# Patient Record
Sex: FEMALE | NOT HISPANIC OR LATINO
[De-identification: names, ages, dates, MRNs, and addresses within clinical notes are randomized per-mention and may not be internally consistent; named-entity substitution may affect disease eponyms.]

---

## 2021-04-16 ENCOUNTER — APPOINTMENT (OUTPATIENT)
Age: 21
End: 2021-04-16
Payer: COMMERCIAL

## 2021-04-16 PROCEDURE — 0001A: CPT

## 2021-05-07 ENCOUNTER — APPOINTMENT (OUTPATIENT)
Age: 21
End: 2021-05-07
Payer: COMMERCIAL

## 2021-05-07 PROCEDURE — 0002A: CPT

## 2022-09-02 PROBLEM — Z00.00 ENCOUNTER FOR PREVENTIVE HEALTH EXAMINATION: Status: ACTIVE | Noted: 2022-09-02

## 2023-09-19 ENCOUNTER — APPOINTMENT (OUTPATIENT)
Dept: HEMATOLOGY ONCOLOGY | Facility: CLINIC | Age: 23
End: 2023-09-19

## 2024-01-19 ENCOUNTER — OUTPATIENT (OUTPATIENT)
Dept: OUTPATIENT SERVICES | Facility: HOSPITAL | Age: 24
LOS: 1 days | Discharge: HOME | End: 2024-01-19

## 2024-01-19 DIAGNOSIS — Q25.1 COARCTATION OF AORTA: ICD-10-CM

## 2024-01-26 ENCOUNTER — RESULT REVIEW (OUTPATIENT)
Age: 24
End: 2024-01-26

## 2024-01-26 ENCOUNTER — APPOINTMENT (OUTPATIENT)
Dept: HEMATOLOGY ONCOLOGY | Facility: CLINIC | Age: 24
End: 2024-01-26
Payer: COMMERCIAL

## 2024-01-26 ENCOUNTER — OUTPATIENT (OUTPATIENT)
Dept: OUTPATIENT SERVICES | Facility: HOSPITAL | Age: 24
LOS: 1 days | End: 2024-01-26
Payer: COMMERCIAL

## 2024-01-26 ENCOUNTER — NON-APPOINTMENT (OUTPATIENT)
Age: 24
End: 2024-01-26

## 2024-01-26 VITALS
TEMPERATURE: 98.2 F | DIASTOLIC BLOOD PRESSURE: 86 MMHG | SYSTOLIC BLOOD PRESSURE: 125 MMHG | OXYGEN SATURATION: 99 % | HEART RATE: 93 BPM | RESPIRATION RATE: 16 BRPM | HEIGHT: 62.99 IN | BODY MASS INDEX: 26.95 KG/M2 | WEIGHT: 152.12 LBS

## 2024-01-26 DIAGNOSIS — Z78.9 OTHER SPECIFIED HEALTH STATUS: ICD-10-CM

## 2024-01-26 DIAGNOSIS — Z82.49 FAMILY HISTORY OF ISCHEMIC HEART DISEASE AND OTHER DISEASES OF THE CIRCULATORY SYSTEM: ICD-10-CM

## 2024-01-26 DIAGNOSIS — Q25.1 COARCTATION OF AORTA: ICD-10-CM

## 2024-01-26 DIAGNOSIS — Z80.3 FAMILY HISTORY OF MALIGNANT NEOPLASM OF BREAST: ICD-10-CM

## 2024-01-26 DIAGNOSIS — D72.818 OTHER DECREASED WHITE BLOOD CELL COUNT: ICD-10-CM

## 2024-01-26 LAB
BASOPHILS # BLD AUTO: 0.03 K/UL — SIGNIFICANT CHANGE UP (ref 0–0.2)
BASOPHILS NFR BLD AUTO: 0.8 % — SIGNIFICANT CHANGE UP (ref 0–2)
EOSINOPHIL # BLD AUTO: 0.06 K/UL — SIGNIFICANT CHANGE UP (ref 0–0.5)
EOSINOPHIL NFR BLD AUTO: 1.7 % — SIGNIFICANT CHANGE UP (ref 0–6)
HCT VFR BLD CALC: 40.1 % — SIGNIFICANT CHANGE UP (ref 34.5–45)
HGB BLD-MCNC: 14.5 G/DL — SIGNIFICANT CHANGE UP (ref 11.5–15.5)
IMM GRANULOCYTES NFR BLD AUTO: 0.3 % — SIGNIFICANT CHANGE UP (ref 0–0.9)
LYMPHOCYTES # BLD AUTO: 1.37 K/UL — SIGNIFICANT CHANGE UP (ref 1–3.3)
LYMPHOCYTES # BLD AUTO: 38.4 % — SIGNIFICANT CHANGE UP (ref 13–44)
MCHC RBC-ENTMCNC: 30.5 PG — SIGNIFICANT CHANGE UP (ref 27–34)
MCHC RBC-ENTMCNC: 36.2 G/DL — HIGH (ref 32–36)
MCV RBC AUTO: 84.4 FL — SIGNIFICANT CHANGE UP (ref 80–100)
MONOCYTES # BLD AUTO: 0.23 K/UL — SIGNIFICANT CHANGE UP (ref 0–0.9)
MONOCYTES NFR BLD AUTO: 6.4 % — SIGNIFICANT CHANGE UP (ref 2–14)
NEUTROPHILS # BLD AUTO: 1.87 K/UL — SIGNIFICANT CHANGE UP (ref 1.8–7.4)
NEUTROPHILS NFR BLD AUTO: 52.4 % — SIGNIFICANT CHANGE UP (ref 43–77)
NRBC # BLD: 0 /100 WBCS — SIGNIFICANT CHANGE UP (ref 0–0)
PLATELET # BLD AUTO: 201 K/UL — SIGNIFICANT CHANGE UP (ref 150–400)
RBC # BLD: 4.75 M/UL — SIGNIFICANT CHANGE UP (ref 3.8–5.2)
RBC # FLD: 12.6 % — SIGNIFICANT CHANGE UP (ref 10.3–14.5)
WBC # BLD: 3.57 K/UL — LOW (ref 3.8–10.5)
WBC # FLD AUTO: 3.57 K/UL — LOW (ref 3.8–10.5)

## 2024-01-26 PROCEDURE — 99214 OFFICE O/P EST MOD 30 MIN: CPT

## 2024-01-26 PROCEDURE — 86901 BLOOD TYPING SEROLOGIC RH(D): CPT

## 2024-01-26 PROCEDURE — 86900 BLOOD TYPING SEROLOGIC ABO: CPT

## 2024-01-26 PROCEDURE — 86905 BLOOD TYPING RBC ANTIGENS: CPT

## 2024-01-26 PROCEDURE — 86850 RBC ANTIBODY SCREEN: CPT

## 2024-01-26 RX ORDER — NORGESTIMATE AND ETHINYL ESTRADIOL 7DAYSX3 LO
0.18/0.215/0.25 KIT ORAL
Refills: 0 | Status: ACTIVE | COMMUNITY

## 2024-01-26 NOTE — REASON FOR VISIT
[Follow-Up Visit] : a follow-up visit for [Blood Count Assessment] : blood count assessment [FreeTextEntry2] : chronic leukopenia

## 2024-01-26 NOTE — ASSESSMENT
[FreeTextEntry1] : 22 yo woman with asymptomatic leukopenia; negative T-cell receptor gene rearrangement; negative rheumatoid factor  - will check ABO blood grouping to evaluate for Thomas antigen which can be abnormal in patients with benign ethinic neutropenia - will review peripheral blood smear - no acute hematology intervention indicated  HEALTH MAINTENANCE SCREENING RECOMMENDATIONS PMD at least annually with lipid checks/bloodwork,  GYN at least annually and PAP test screening per their recommendations,   - Patient had the opportunity to have all their questions answered to their satisfaction - f/u annually

## 2024-01-26 NOTE — HISTORY OF PRESENT ILLNESS
[de-identified] : 22 yo woman previously followed at [facility] for chronic leukopenia with no evidence of clinical findings such as recurrent infections. Evaluation at that time showed negative TCR gene rearrangement and negative rheumatoid factor. She has been feeling well without any new complaints.  Transferring care as of 1/26/2024 from [facility] to  [facility] [de-identified] : 1/26/24 All of the patient's prior records including radiology, pathology and prior notes reviewed; Past Medical History, Past Surgical History, Family History and Social history reviewed and updated in the patient's chart. [100: Normal, no complaints, no evidence of disease.] : 100: Normal, no complaints, no evidence of disease.

## 2024-01-29 ENCOUNTER — TRANSCRIPTION ENCOUNTER (OUTPATIENT)
Age: 24
End: 2024-01-29

## 2024-01-29 LAB
ALBUMIN SERPL ELPH-MCNC: 4.8 G/DL
ALP BLD-CCNC: 61 U/L
ALT SERPL-CCNC: 12 U/L
ANION GAP SERPL CALC-SCNC: 10 MMOL/L
AST SERPL-CCNC: 14 U/L
BILIRUB SERPL-MCNC: 0.8 MG/DL
BUN SERPL-MCNC: 14 MG/DL
CALCIUM SERPL-MCNC: 9.4 MG/DL
CHLORIDE SERPL-SCNC: 106 MMOL/L
CO2 SERPL-SCNC: 24 MMOL/L
CREAT SERPL-MCNC: 0.74 MG/DL
EGFR: 115 ML/MIN/1.73M2
GLUCOSE SERPL-MCNC: 91 MG/DL
POTASSIUM SERPL-SCNC: 4.7 MMOL/L
PROT SERPL-MCNC: 6.9 G/DL
SODIUM SERPL-SCNC: 140 MMOL/L

## 2024-12-07 ENCOUNTER — INPATIENT (INPATIENT)
Facility: HOSPITAL | Age: 24
LOS: 2 days | Discharge: HOME | End: 2024-12-10
Attending: DENTIST | Admitting: DENTIST
Payer: COMMERCIAL

## 2024-12-07 VITALS
TEMPERATURE: 100 F | HEIGHT: 63 IN | DIASTOLIC BLOOD PRESSURE: 81 MMHG | HEART RATE: 81 BPM | SYSTOLIC BLOOD PRESSURE: 127 MMHG | OXYGEN SATURATION: 98 % | RESPIRATION RATE: 18 BRPM | WEIGHT: 149.91 LBS

## 2024-12-07 DIAGNOSIS — J39.0 RETROPHARYNGEAL AND PARAPHARYNGEAL ABSCESS: ICD-10-CM

## 2024-12-07 LAB
ALBUMIN SERPL ELPH-MCNC: 3.6 G/DL — SIGNIFICANT CHANGE UP (ref 3.3–5)
ALP SERPL-CCNC: 72 U/L — SIGNIFICANT CHANGE UP (ref 40–120)
ALT FLD-CCNC: 36 U/L — HIGH (ref 4–33)
ANION GAP SERPL CALC-SCNC: 12 MMOL/L — SIGNIFICANT CHANGE UP (ref 7–14)
APTT BLD: 31 SEC — SIGNIFICANT CHANGE UP (ref 24.5–35.6)
APTT BLD: 31.3 SEC — SIGNIFICANT CHANGE UP (ref 24.5–35.6)
AST SERPL-CCNC: 10 U/L — SIGNIFICANT CHANGE UP (ref 4–32)
BASOPHILS # BLD AUTO: 0 K/UL — SIGNIFICANT CHANGE UP (ref 0–0.2)
BASOPHILS NFR BLD AUTO: 0 % — SIGNIFICANT CHANGE UP (ref 0–2)
BILIRUB SERPL-MCNC: 0.7 MG/DL — SIGNIFICANT CHANGE UP (ref 0.2–1.2)
BLD GP AB SCN SERPL QL: NEGATIVE — SIGNIFICANT CHANGE UP
BUN SERPL-MCNC: 12 MG/DL — SIGNIFICANT CHANGE UP (ref 7–23)
CALCIUM SERPL-MCNC: 8.3 MG/DL — LOW (ref 8.4–10.5)
CHLORIDE SERPL-SCNC: 107 MMOL/L — SIGNIFICANT CHANGE UP (ref 98–107)
CO2 SERPL-SCNC: 21 MMOL/L — LOW (ref 22–31)
CREAT SERPL-MCNC: 0.64 MG/DL — SIGNIFICANT CHANGE UP (ref 0.5–1.3)
EGFR: 125 ML/MIN/1.73M2 — SIGNIFICANT CHANGE UP
EOSINOPHIL # BLD AUTO: 0.03 K/UL — SIGNIFICANT CHANGE UP (ref 0–0.5)
EOSINOPHIL NFR BLD AUTO: 0.4 % — SIGNIFICANT CHANGE UP (ref 0–6)
GLUCOSE SERPL-MCNC: 85 MG/DL — SIGNIFICANT CHANGE UP (ref 70–99)
GRAM STN FLD: ABNORMAL
HCG SERPL-ACNC: <1 MIU/ML — SIGNIFICANT CHANGE UP
HCT VFR BLD CALC: 33.6 % — LOW (ref 34.5–45)
HGB BLD-MCNC: 11.7 G/DL — SIGNIFICANT CHANGE UP (ref 11.5–15.5)
IANC: 5.14 K/UL — SIGNIFICANT CHANGE UP (ref 1.8–7.4)
IMM GRANULOCYTES NFR BLD AUTO: 0.4 % — SIGNIFICANT CHANGE UP (ref 0–0.9)
INR BLD: 1.13 RATIO — SIGNIFICANT CHANGE UP (ref 0.85–1.16)
INR BLD: 1.16 RATIO — SIGNIFICANT CHANGE UP (ref 0.85–1.16)
LYMPHOCYTES # BLD AUTO: 2.15 K/UL — SIGNIFICANT CHANGE UP (ref 1–3.3)
LYMPHOCYTES # BLD AUTO: 26.1 % — SIGNIFICANT CHANGE UP (ref 13–44)
MCHC RBC-ENTMCNC: 28.9 PG — SIGNIFICANT CHANGE UP (ref 27–34)
MCHC RBC-ENTMCNC: 34.8 G/DL — SIGNIFICANT CHANGE UP (ref 32–36)
MCV RBC AUTO: 83 FL — SIGNIFICANT CHANGE UP (ref 80–100)
MONOCYTES # BLD AUTO: 0.9 K/UL — SIGNIFICANT CHANGE UP (ref 0–0.9)
MONOCYTES NFR BLD AUTO: 10.9 % — SIGNIFICANT CHANGE UP (ref 2–14)
NEUTROPHILS # BLD AUTO: 5.14 K/UL — SIGNIFICANT CHANGE UP (ref 1.8–7.4)
NEUTROPHILS NFR BLD AUTO: 62.2 % — SIGNIFICANT CHANGE UP (ref 43–77)
NRBC # BLD: 0 /100 WBCS — SIGNIFICANT CHANGE UP (ref 0–0)
NRBC # FLD: 0 K/UL — SIGNIFICANT CHANGE UP (ref 0–0)
PLATELET # BLD AUTO: 177 K/UL — SIGNIFICANT CHANGE UP (ref 150–400)
POTASSIUM SERPL-MCNC: 3.3 MMOL/L — LOW (ref 3.5–5.3)
POTASSIUM SERPL-SCNC: 3.3 MMOL/L — LOW (ref 3.5–5.3)
PROT SERPL-MCNC: 5.7 G/DL — LOW (ref 6–8.3)
PROTHROM AB SERPL-ACNC: 13.1 SEC — SIGNIFICANT CHANGE UP (ref 9.9–13.4)
PROTHROM AB SERPL-ACNC: 13.4 SEC — SIGNIFICANT CHANGE UP (ref 9.9–13.4)
RBC # BLD: 4.05 M/UL — SIGNIFICANT CHANGE UP (ref 3.8–5.2)
RBC # FLD: 13 % — SIGNIFICANT CHANGE UP (ref 10.3–14.5)
RH IG SCN BLD-IMP: POSITIVE — SIGNIFICANT CHANGE UP
SODIUM SERPL-SCNC: 140 MMOL/L — SIGNIFICANT CHANGE UP (ref 135–145)
SPECIMEN SOURCE: SIGNIFICANT CHANGE UP
WBC # BLD: 8.25 K/UL — SIGNIFICANT CHANGE UP (ref 3.8–10.5)
WBC # FLD AUTO: 8.25 K/UL — SIGNIFICANT CHANGE UP (ref 3.8–10.5)

## 2024-12-07 PROCEDURE — 99285 EMERGENCY DEPT VISIT HI MDM: CPT

## 2024-12-07 PROCEDURE — 88300 SURGICAL PATH GROSS: CPT | Mod: 26

## 2024-12-07 RX ORDER — 0.9 % SODIUM CHLORIDE 0.9 %
1000 INTRAVENOUS SOLUTION INTRAVENOUS
Refills: 0 | Status: DISCONTINUED | OUTPATIENT
Start: 2024-12-07 | End: 2024-12-09

## 2024-12-07 RX ORDER — METOCLOPRAMIDE HYDROCHLORIDE 10 MG/1
10 TABLET ORAL ONCE
Refills: 0 | Status: DISCONTINUED | OUTPATIENT
Start: 2024-12-07 | End: 2024-12-10

## 2024-12-07 RX ORDER — ONDANSETRON HYDROCHLORIDE 4 MG/1
4 TABLET, FILM COATED ORAL EVERY 8 HOURS
Refills: 0 | Status: DISCONTINUED | OUTPATIENT
Start: 2024-12-07 | End: 2024-12-10

## 2024-12-07 RX ORDER — CHLORHEXIDINE GLUCONATE 1.2 MG/ML
15 RINSE ORAL
Refills: 0 | Status: DISCONTINUED | OUTPATIENT
Start: 2024-12-07 | End: 2024-12-10

## 2024-12-07 RX ORDER — AMPICILLIN AND SULBACTAM 1; .5 G/1; G/1
3 INJECTION, POWDER, FOR SOLUTION INTRAVENOUS ONCE
Refills: 0 | Status: COMPLETED | OUTPATIENT
Start: 2024-12-07 | End: 2024-12-07

## 2024-12-07 RX ORDER — ACETAMINOPHEN 500MG 500 MG/1
650 TABLET, COATED ORAL EVERY 6 HOURS
Refills: 0 | Status: DISCONTINUED | OUTPATIENT
Start: 2024-12-07 | End: 2024-12-09

## 2024-12-07 RX ORDER — OXYCODONE HYDROCHLORIDE 30 MG/1
5 TABLET ORAL EVERY 4 HOURS
Refills: 0 | Status: DISCONTINUED | OUTPATIENT
Start: 2024-12-07 | End: 2024-12-09

## 2024-12-07 RX ORDER — AMPICILLIN AND SULBACTAM 1; .5 G/1; G/1
INJECTION, POWDER, FOR SOLUTION INTRAVENOUS
Refills: 0 | Status: DISCONTINUED | OUTPATIENT
Start: 2024-12-07 | End: 2024-12-10

## 2024-12-07 RX ORDER — KETOROLAC TROMETHAMINE 30 MG/ML
15 INJECTION INTRAMUSCULAR; INTRAVENOUS ONCE
Refills: 0 | Status: DISCONTINUED | OUTPATIENT
Start: 2024-12-07 | End: 2024-12-07

## 2024-12-07 RX ORDER — ACETAMINOPHEN, DIPHENHYDRAMINE HCL, PHENYLEPHRINE HCL 325; 25; 5 MG/1; MG/1; MG/1
3 TABLET ORAL AT BEDTIME
Refills: 0 | Status: DISCONTINUED | OUTPATIENT
Start: 2024-12-07 | End: 2024-12-10

## 2024-12-07 RX ORDER — OXYCODONE HYDROCHLORIDE 30 MG/1
10 TABLET ORAL EVERY 4 HOURS
Refills: 0 | Status: DISCONTINUED | OUTPATIENT
Start: 2024-12-07 | End: 2024-12-09

## 2024-12-07 RX ORDER — IBUPROFEN 200 MG
600 TABLET ORAL EVERY 6 HOURS
Refills: 0 | Status: DISCONTINUED | OUTPATIENT
Start: 2024-12-07 | End: 2024-12-10

## 2024-12-07 RX ORDER — SENNOSIDES 8.6 MG
2 TABLET ORAL AT BEDTIME
Refills: 0 | Status: DISCONTINUED | OUTPATIENT
Start: 2024-12-07 | End: 2024-12-10

## 2024-12-07 RX ORDER — NALOXONE HCL 0.4 MG/ML
0.4 AMPUL (ML) INJECTION ONCE
Refills: 0 | Status: DISCONTINUED | OUTPATIENT
Start: 2024-12-07 | End: 2024-12-10

## 2024-12-07 RX ORDER — POLYETHYLENE GLYCOL 3350 17 G/17G
17 POWDER, FOR SOLUTION ORAL DAILY
Refills: 0 | Status: DISCONTINUED | OUTPATIENT
Start: 2024-12-07 | End: 2024-12-10

## 2024-12-07 RX ORDER — INFLUENZA VIRUS VACCINE 15; 15; 15; 15 UG/.5ML; UG/.5ML; UG/.5ML; UG/.5ML
0.5 SUSPENSION INTRAMUSCULAR ONCE
Refills: 0 | Status: DISCONTINUED | OUTPATIENT
Start: 2024-12-07 | End: 2024-12-10

## 2024-12-07 RX ORDER — 0.9 % SODIUM CHLORIDE 0.9 %
1000 INTRAVENOUS SOLUTION INTRAVENOUS
Refills: 0 | Status: DISCONTINUED | OUTPATIENT
Start: 2024-12-07 | End: 2024-12-07

## 2024-12-07 RX ORDER — AMPICILLIN AND SULBACTAM 1; .5 G/1; G/1
3 INJECTION, POWDER, FOR SOLUTION INTRAVENOUS EVERY 6 HOURS
Refills: 0 | Status: DISCONTINUED | OUTPATIENT
Start: 2024-12-08 | End: 2024-12-10

## 2024-12-07 RX ADMIN — OXYCODONE HYDROCHLORIDE 10 MILLIGRAM(S): 30 TABLET ORAL at 22:10

## 2024-12-07 RX ADMIN — Medication 2 MILLIGRAM(S): at 19:39

## 2024-12-07 RX ADMIN — Medication 125 MILLILITER(S): at 16:10

## 2024-12-07 RX ADMIN — Medication 4 MILLIGRAM(S): at 13:01

## 2024-12-07 RX ADMIN — KETOROLAC TROMETHAMINE 15 MILLIGRAM(S): 30 INJECTION INTRAMUSCULAR; INTRAVENOUS at 16:51

## 2024-12-07 RX ADMIN — OXYCODONE HYDROCHLORIDE 10 MILLIGRAM(S): 30 TABLET ORAL at 21:35

## 2024-12-07 RX ADMIN — AMPICILLIN AND SULBACTAM 200 GRAM(S): 1; .5 INJECTION, POWDER, FOR SOLUTION INTRAVENOUS at 16:14

## 2024-12-07 RX ADMIN — Medication 4 MILLIGRAM(S): at 13:39

## 2024-12-07 RX ADMIN — Medication 108 MILLILITER(S): at 19:39

## 2024-12-07 NOTE — CONSULT NOTE ADULT - SUBJECTIVE AND OBJECTIVE BOX
HPI: 24y Female w no sig PMH transferred from [facility] for evaluation of right parapharyngeal abscess that developed following R lower gingival excision by periodontist one week ago    Afebrile in ED, WBC 8.25    Allergies    No Known Allergies    Intolerances        PAST MEDICAL & SURGICAL HISTORY:      SOCIAL HISTORY:  Tobacco History:  ETOH Use:   Drug Use:     FAMILY HISTORY:      REVIEW OF SYSTEMS    General:	  As per HPI      MEDICATIONS:        Vital Signs Last 24 Hrs  T(C): 37.6 (07 Dec 2024 10:54), Max: 37.6 (07 Dec 2024 10:54)  T(F): 99.7 (07 Dec 2024 10:54), Max: 99.7 (07 Dec 2024 10:54)  HR: 81 (07 Dec 2024 10:54) (81 - 81)  BP: 127/81 (07 Dec 2024 10:54) (127/81 - 127/81)  BP(mean): --  RR: 18 (07 Dec 2024 10:54) (18 - 18)  SpO2: 98% (07 Dec 2024 10:54) (98% - 98%)    Parameters below as of 07 Dec 2024 10:54  Patient On (Oxygen Delivery Method): room air        LABS:  CBC-          Coagulation Studies-    Endocrine Panel-        PHYSICAL EXAM:    ENT EXAM-   Constitutional: Well-developed, well-nourished.   Voice: No hoarseness.     Head:  normocephalic, atraumatic.   Ears:  External ears normal  Nose:  Septum intact. Inferior turbinates normal bilateral  OC/OP: Tongue midline, tonsils  Floor of mouth, buccal mucosa, lips, hard palate, soft palate, uvula, posterior pharyngeal wall normal.  Mucosa moist.  Neck:  Trachea midline.  Thyroid, parotid and submandibular glands normal.  Lymph:  No cervical adenopathy.    LARYNGOSCOPY EXAM:     -Verbal consent was obtained from patient prior to procedure.    Indication:    Anesthesia: None    Flexible laryngoscopy was performed and revealed the following:    -- Nasopharynx had no mass or exudate.    -- Base of tongue was symmetric and not enlarged.    -- Vallecula was clear    -- Epiglottis, both aryepiglottic folds and both false vocal folds were normal    -- Arytenoids both without edema and erythema     -- True vocal folds were fully mobile and without lesions.     -- Post cricoid area was clear.    -- Interarytenoid edema was absent    The patient tolerated the procedure well.        RADIOLOGY & ADDITIONAL STUDIES:      Assessment/Plan:  24y Female         HPI: 24y Female w no sig PMH transferred from [facility] for evaluation of right parapharyngeal abscess that developed following R lower gingival excision by periodontist 11/29. Pt states she was noted to have "some growth" on her right gingiva by her periodontist but was asymptomatic from it, had no pain in the region, but dentist said it would be better to remove it due to issues biting down on it. Pt reports following excision, developed pain in the region which has been progressive since onset. Last night developed swelling around R jaw and neck with pain on swallowing. Denies difficulty breathing or voice changes. Has been afebrile. Has never had sx like this before. CT neck at OSH shows R parapharyngeal abscess measuring 2.4cm. Has not yet been treated with abx until 5am today    Afebrile in ED, WBC 8.25    Allergies    No Known Allergies    Intolerances        PAST MEDICAL & SURGICAL HISTORY:      SOCIAL HISTORY:  Tobacco History:  ETOH Use:   Drug Use:     FAMILY HISTORY:      REVIEW OF SYSTEMS    General:	  As per HPI      MEDICATIONS:        Vital Signs Last 24 Hrs  T(C): 37.6 (07 Dec 2024 10:54), Max: 37.6 (07 Dec 2024 10:54)  T(F): 99.7 (07 Dec 2024 10:54), Max: 99.7 (07 Dec 2024 10:54)  HR: 81 (07 Dec 2024 10:54) (81 - 81)  BP: 127/81 (07 Dec 2024 10:54) (127/81 - 127/81)  BP(mean): --  RR: 18 (07 Dec 2024 10:54) (18 - 18)  SpO2: 98% (07 Dec 2024 10:54) (98% - 98%)    Parameters below as of 07 Dec 2024 10:54  Patient On (Oxygen Delivery Method): room air        LABS:  CBC-          Coagulation Studies-    Endocrine Panel-        PHYSICAL EXAM:    ENT EXAM-   Constitutional: Well-developed, well-nourished.   Voice: No hoarseness.     Head:  normocephalic, atraumatic.   Ears:  External ears normal  Nose:  Septum intact. Inferior turbinates normal bilateral  OC/OP: Tongue midline, tonsils wnl. Significant purulence from R gingiva, cultured. Floor of mouth, buccal mucosa, lips, hard palate, soft palate, uvula, posterior pharyngeal wall normal.  Mucosa moist.  Neck:  Swelling along R mandible/R SM space w TTP     LARYNGOSCOPY EXAM:     -Verbal consent was obtained from patient prior to procedure.    Indication:    Anesthesia: None    Flexible laryngoscopy was performed and revealed the following:    -- Nasopharynx had no mass or exudate.    -- Base of tongue was symmetric and not enlarged.    -- Vallecula was clear    -- Epiglottis, both aryepiglottic folds and both false vocal folds were normal    -- Arytenoids both without edema and erythema     -- True vocal folds were fully mobile and without lesions.     -- Post cricoid area was clear.    -- Interarytenoid edema was absent    The patient tolerated the procedure well.        RADIOLOGY & ADDITIONAL STUDIES:      Assessment/Plan:    24y Female w no sig PMH transferred from [facility] for evaluation of right parapharyngeal abscess that developed following R lower gingival excision by periodontist 11/29.  CT neck at OSH shows R parapharyngeal abscess measuring 2.4cm. exam significant for gross purulence coming from R gingiva, cultured, with TTP and swelling along R mandible/R submandibular space     - rec OMFS consult  - fu cx  - c/w IV abx (unasyn)  - NPO/mIVF in the interim

## 2024-12-07 NOTE — ED ADULT NURSE NOTE - OBJECTIVE STATEMENT
25 y/o F arrives to E.D. intake area as a transfer from [facility] for ENT for submandibular abscess that developed 1 wk after surgery. Denies PHx. Pt is a&ox4, ambulatory, neg SOB, neg chest pain, neg N/V/D. Arrives with L 20g IV. Frequent monitoring in place.

## 2024-12-07 NOTE — ED PROVIDER NOTE - ADMIT DISPOSITION PRESENT ON ADMISSION SEPSIS
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    I at not time intend to place undue burden to the supervisors, administrators or coworkers of [facility], we are acting only out of an abundance of caution for health and safety.      Sincerely,          Bonnie Mcgregor PA-C  [facility]   Department of Rheumatology   [phone]      
No

## 2024-12-07 NOTE — H&P ADULT - NSHPLABSRESULTS_GEN_ALL_CORE
Vital Signs Last 24 Hrs  T(C): 37.1 (07 Dec 2024 13:39), Max: 37.6 (07 Dec 2024 10:54)  T(F): 98.7 (07 Dec 2024 13:39), Max: 99.7 (07 Dec 2024 10:54)  HR: 95 (07 Dec 2024 13:39) (81 - 95)  BP: 124/80 (07 Dec 2024 13:39) (115/75 - 127/81)  BP(mean): --  RR: 17 (07 Dec 2024 13:39) (17 - 18)  SpO2: 96% (07 Dec 2024 13:39) (96% - 99%)    Parameters below as of 07 Dec 2024 13:39  Patient On (Oxygen Delivery Method): room air      I&O's Detail      Medications:    MEDICATIONS  (STANDING):  ampicillin/sulbactam  IVPB      lactated ringers. 1000 milliLiter(s) (125 mL/Hr) IV Continuous <Continuous>    MEDICATIONS  (PRN):      Labs:                          11.7   8.25  )-----------( 177      ( 07 Dec 2024 11:39 )             33.6       12-07    140  |  107  |  12  ----------------------------<  85  3.3[L]   |  21[L]  |  0.64    Ca    8.3[L]      07 Dec 2024 11:39    TPro  5.7[L]  /  Alb  3.6  /  TBili  0.7  /  DBili  x   /  AST  10  /  ALT  36[H]  /  AlkPhos  72  12-07      Radiographic Exam   Read from Outside Hospital:  CT Neck w/ contrast: 12/7/24: IMPRESSION: 1. Right submandibular inflammation with ductal dilation but no obstructing calcification appreciated. 2. Right parapharyngeal abscess measuring 2.4x1.8x2.6 cm. This abuts the superior aspect of the inflamed submandibular gland.

## 2024-12-07 NOTE — ED PROVIDER NOTE - PROGRESS NOTE DETAILS
Vitaly Ortiz, ED attending: Case d/w ENT consultant Dr. Newsome. Recommended that I consult OMFS as well, since the initial surgery was done by an periodontist. Vitaly Ortiz, ED attending: Pt seen by [facility] consultant Dr. Wilkins. Reports he sees a small amount of pus around right molar gingiva. Vitaly Ortiz, ED attending: Unasyn 3g IV q6h ordered. Per d/w ENT resident Dr. Newsome, ENT team does not intend on any procedural or surgical intervention, and are deferring to OMFS for procedural intervention.

## 2024-12-07 NOTE — ED PROVIDER NOTE - CLINICAL SUMMARY MEDICAL DECISION MAKING FREE TEXT BOX
24 year old female with no significant past medical history transferred to [facility] ED from [facility] for ENT evaluation of right paraphyarngeal abscess that developed after her periodontist performed right lower gingival excision. Has had persistent 24 year old female with no significant past medical history transferred to [facility] ED from [facility] for ENT evaluation of right parapharyngeal abscess that developed after her periodontist performed right lower gingival excision. Has had persistently worsening pain and swelling to the right jaw since then. Subjective fevers at home. Went. to [facility] ED this morning due to pain, sweling, difficulty swallowing 2/2 pain. CT neck w/ IV contrast shows "Fluid collection right parapharyngeal space at the level of the mandibular angle measuring 2.4 x 1.8 x 2.6 cm with mild peripheral enhancement. This abuts the superior aspect of the inflamed submandibular gland. This is typical of an abscess." Epiglottis and valecula normal. Started on IV Unasyn, given morphine, toradol, and IV tylenol for pain and fever. Patient transferred to [facility] ED for ENT eval. ENT Attending Dr. Morris aware of pending arrival.    Physical Exam  GEN: Alert and oriented x 3, in no acute distress, speaking full clear sentences  HEENT: NC/AT, PERRL, EOMI. + Trismus, exam limited - some gingiva swelling noted.  RIght face and neck mildly swollen, tender starting right max extending down to right submandibular region, no open wound.  NECK: RIght face and neck mildly swollen, tender starting right max extending down to right submandibular region, no open wound. No stridor. Tolerating secretions without difficulty.  CV: RRR, no m/r/g  PULM: CTA bilat, no wheezing/rales/rhonchi  ABD: Soft, nontender, nondistended. No organomegaly  EXTR: FROM to all extremities, nontender, no edema  SKIN: Warm, dry, no rash  NEURO: AOx3, speaking full clear sentences, WORLEY 5/5 strength, ambulating with stable gait    Plan:  -Upload CT images from [facility] to our PACS  -Continue IV Unasyn  -Continue pain control PRN  -Consult ENT for evaluation

## 2024-12-07 NOTE — ED ADULT NURSE REASSESSMENT NOTE - NS ED NURSE REASSESS COMMENT FT1
Pt resting on stretcher, NAD noted. Family at bedside. Pending admission. Frequent monitoring in place.

## 2024-12-07 NOTE — ED ADULT NURSE NOTE - IS THE PATIENT ABLE TO BE SCREENED?
[At Term] : at term [[place] : in [place] [ Section] : by  section [de-identified] : Left the hospital on time  Yes

## 2024-12-07 NOTE — H&P ADULT - HISTORY OF PRESENT ILLNESS
24 y.o. female presenting to [facility] ED after transfer from [facility] for evaluation of right parapharyngeal abscess that developed 1 week after operculum removed from tooth #31 by outside periodontitis. ENT evaluated the patient, noted purulence behind tooth #31, asked for OMFS consult. CT at outside hospital shows notes right sided parapharyngeal abscess and submandibular edema. Patient denies dyspnea and dysphonia, endorses dysphagia. At the time of the exam the patient was AAOx3 and in no acute pain or respiratory distress. The patient acted as the informant    Past Medical History   PMHx: Denies   Meds: Denies   All: Denies   SocHx: Alcohol socially    SurgicalHx: Denies

## 2024-12-07 NOTE — H&P ADULT - NSHPPHYSICALEXAM_GEN_ALL_CORE
REVIEW OF SYSTEMS:    CONSTITUTIONAL:  No weakness, fevers or chills  H: Atraumatic. The laceration was hemostatic in nature and without the presence of any foreign objects or debris within the laceration. CN V1, V2, V3 grossly intact bilaterally (+pinprick, +brush stroke) with no evidence of CN7 nerve weakness. Inferior border of the mandible is palpable bilaterally. MORALES 10 mm, indurated edema of right submandibular area, skin over right neck feels warm and indurated. Right buccal slight edema. All edematous areas are painful to palpation.     E: + PERRLA. EOMI with no visual disturbances or signs of orbital trauma. No palpable step defects to the orbital rims bilaterally. No periorbital edema, subconjunctival hemorrhage, hyphema, chemosis or periorbital ecchymosis noted bilaterally.      E: No otorrhea, tinnitus, or Argueta's sign.      N: Nasal dorsum is midline with no cosmetic deformity. No edema, mobility or crepitus to nasal dorsum. Nares patent bilaterally with no dried blood, rhinorrhea, active epistaxis or septal hematoma.      T: Trachea is midline with no palpable masses     Intraoral Exam:   No FOM elevation, FOM, small amount of purulence noted extruding from distal gingiva of tooth #31 when palpated. Limited examination possible due to MORALES ~10 mm, able to push up to 15 mm. No vestibular swelling noted.      NECK:  No pain or stiffness  RESPIRATORY:  No cough, wheezing, hemoptysis; No shortness of breath  CARDIOVASCULAR:  No chest pain or palpitations  GASTROINTESTINAL:  No abdominal or epigastric pain. No nausea, vomiting, or hematemesis; No diarrhea or constipation. No melena or hematochezia.  GENITOURINARY:  No dysuria, frequency or hematuria  MUSCULOSKELETAL:  FROM all extremities, normal strength, No calf tenderness  NEUROLOGICAL:  No numbness or weakness  SKIN:  No itching, rashes CONSTITUTIONAL: Well groomed, no apparent distress    H: Atraumatic. The laceration was hemostatic in nature and without the presence of any foreign objects or debris within the laceration. CN V1, V2, V3 grossly intact bilaterally (+pinprick, +brush stroke) with no evidence of CN7 nerve weakness. Inferior border of the mandible is palpable bilaterally. MORALES 10 mm, indurated edema of right submandibular area, skin over right neck feels warm and indurated. Right buccal slight edema. All edematous areas are painful to palpation.   E: + PERRLA. EOMI with no visual disturbances or signs of orbital trauma. No palpable step defects to the orbital rims bilaterally. No periorbital edema, subconjunctival hemorrhage, hyphema, chemosis or periorbital ecchymosis noted bilaterally.    E: No otorrhea, tinnitus, or Argueta's sign.    N: Nasal dorsum is midline with no cosmetic deformity. No edema, mobility or crepitus to nasal dorsum. Nares patent bilaterally with no dried blood, rhinorrhea, active epistaxis or septal hematoma.    T: Trachea is midline with no palpable masses     Intraoral Exam:   No FOM elevation, FOM, small amount of purulence noted extruding from distal gingiva of tooth #31 when palpated. Limited examination possible due to MORALES ~10 mm, able to push up to 15 mm. No vestibular swelling noted.      RESP: No respiratory distress, no use of accessory muscles;  CV: RRR, no JVD; no peripheral edema  GI: Soft, NT, ND, no rebound, no guarding; no palpable masses; no hepatosplenomegaly; no hernia palpated  LYMPH: No cervical LAD or tenderness; no axillary LAD or tenderness; no inguinal LAD or tenderness  MSK: Normal gait; No digital clubbing or cyanosis; examination of the (head/neck/spine/ribs/pelvis, RUE, LUE, RLE, LLE) without misalignment,            Normal ROM without pain, no spinal tenderness, normal muscle strength/tone  SKIN: No rashes or ulcers noted; no subcutaneous nodules or induration palpable  NEURO: CN II-XII intact; normal reflexes in upper and lower extremities, sensation intact in upper and lower extremities b/l to light touch   PSYCH: Appropriate insight/judgment; A+O x 3, mood and affect appropriate, recent/remote memory intact

## 2024-12-07 NOTE — ED ADULT NURSE NOTE - NSFALLUNIVINTERV_ED_ALL_ED
Bed/Stretcher in lowest position, wheels locked, appropriate side rails in place/Call bell, personal items and telephone in reach/Instruct patient to call for assistance before getting out of bed/chair/stretcher/Non-slip footwear applied when patient is off stretcher/[place] to call system/Physically safe environment - no spills, clutter or unnecessary equipment/Purposeful proactive rounding/Room/bathroom lighting operational, light cord in reach

## 2024-12-07 NOTE — H&P ADULT - ASSESSMENT
23 y/o F presents as transfer to [facility] for right sided parapharyngeal abscess possibly secondary to a post operative gingival surgery infection    Recs:   - Admit to OMFS under attending Dr. Destini Torres  - Plan for incision and drainage of right parapharyngeal space infection tomorrow in OR as add on  - NPO w/ IV maintenance fluids  - pre op labs, bhcg  - Chlorhexidine BID  - Multimodal pain control   - Ambulate as tolerated

## 2024-12-07 NOTE — ED ADULT NURSE REASSESSMENT NOTE - NS ED NURSE REASSESS COMMENT FT1
Pt resting on stretcher, NAD noted. OMFS consulted at bedside. Pending dispo. Frequent monitoring in place.

## 2024-12-07 NOTE — ED ADULT NURSE NOTE - CHIEF COMPLAINT QUOTE
transfer from [facility]  for ETN consult due to right lower jaw submandibular abscess. pt reports had surgery done at that site 1 week ago. received 4 mg morphine, 15 mg Toradol and 3 g on Ampicillin PTA. left AC 20 G in place, pt able to speak in full and complete sentences.  provider please see pt's chart for accompanying paperwork and CD disc.

## 2024-12-07 NOTE — H&P ADULT - NSHPREVIEWOFSYSTEMS_GEN_ALL_CORE
CONSTITUTIONAL:  No weakness, fevers or chills  EYES/ENT:  No visual changes;  No vertigo or throat pain   NECK:  No stiffness, sore and feels warm  RESPIRATORY:  No cough, wheezing, hemoptysis; No shortness of breath  CARDIOVASCULAR:  No chest pain or palpitations  GASTROINTESTINAL:  No abdominal or epigastric pain. No nausea, vomiting, or hematemesis; No diarrhea or constipation. No melena or hematochezia.  GENITOURINARY:  No dysuria, frequency or hematuria  MUSCULOSKELETAL:  FROM all extremities, normal strength, No calf tenderness  NEUROLOGICAL:  No numbness or weakness  SKIN:  No itching, rashes

## 2024-12-07 NOTE — PATIENT PROFILE ADULT - FALL HARM RISK - UNIVERSAL INTERVENTIONS
Called [facility] for re-scheduled MTM appointment. Unable to reach - left message for return call.    Obie ChadwickD  Medication Therapy Management Resident  Pager: [phone]    
Bed in lowest position, wheels locked, appropriate side rails in place/Call bell, personal items and telephone in reach/Instruct patient to call for assistance before getting out of bed or chair/Non-slip footwear when patient is out of bed/[place] to call system/Physically safe environment - no spills, clutter or unnecessary equipment/Purposeful Proactive Rounding/Room/bathroom lighting operational, light cord in reach

## 2024-12-07 NOTE — ED ADULT TRIAGE NOTE - CHIEF COMPLAINT QUOTE
transfer from [facility]  for ETN consult due to right lower jaw submandibular abscess. pt reports had surgery done at that site 1 week ago. received 4 mg morphine, 15 mg Toradol and 3 g on Ampicillin PTA. left AC 20 G in place, pt able to speak in full and complete sentences. transfer from [facility]  for ETN consult due to right lower jaw submandibular abscess. pt reports had surgery done at that site 1 week ago. received 4 mg morphine, 15 mg Toradol and 3 g on Ampicillin PTA. left AC 20 G in place, pt able to speak in full and complete sentences.  provider please see pt's chart for accompanying paperwork and CD disc.

## 2024-12-08 ENCOUNTER — TRANSCRIPTION ENCOUNTER (OUTPATIENT)
Age: 24
End: 2024-12-08

## 2024-12-08 LAB
GRAM STN FLD: ABNORMAL
SPECIMEN SOURCE: SIGNIFICANT CHANGE UP

## 2024-12-08 RX ORDER — ONDANSETRON HYDROCHLORIDE 4 MG/1
4 TABLET, FILM COATED ORAL ONCE
Refills: 0 | Status: DISCONTINUED | OUTPATIENT
Start: 2024-12-08 | End: 2024-12-08

## 2024-12-08 RX ORDER — POTASSIUM CHLORIDE 600 MG/1
10 TABLET, EXTENDED RELEASE ORAL
Refills: 0 | Status: COMPLETED | OUTPATIENT
Start: 2024-12-08 | End: 2024-12-08

## 2024-12-08 RX ORDER — POTASSIUM CHLORIDE 600 MG/1
40 TABLET, EXTENDED RELEASE ORAL EVERY 4 HOURS
Refills: 0 | Status: DISCONTINUED | OUTPATIENT
Start: 2024-12-08 | End: 2024-12-08

## 2024-12-08 RX ORDER — FENTANYL 12 UG/H
25 PATCH, EXTENDED RELEASE TRANSDERMAL
Refills: 0 | Status: DISCONTINUED | OUTPATIENT
Start: 2024-12-08 | End: 2024-12-08

## 2024-12-08 RX ORDER — FENTANYL 12 UG/H
50 PATCH, EXTENDED RELEASE TRANSDERMAL
Refills: 0 | Status: DISCONTINUED | OUTPATIENT
Start: 2024-12-08 | End: 2024-12-08

## 2024-12-08 RX ORDER — BENZOCAINE, MENTHOL 15; 3.6 MG/1; MG/1
1 LOZENGE ORAL ONCE
Refills: 0 | Status: COMPLETED | OUTPATIENT
Start: 2024-12-08 | End: 2024-12-08

## 2024-12-08 RX ORDER — OXYCODONE HYDROCHLORIDE 30 MG/1
5 TABLET ORAL ONCE
Refills: 0 | Status: DISCONTINUED | OUTPATIENT
Start: 2024-12-08 | End: 2024-12-08

## 2024-12-08 RX ADMIN — POTASSIUM CHLORIDE 100 MILLIEQUIVALENT(S): 600 TABLET, EXTENDED RELEASE ORAL at 12:32

## 2024-12-08 RX ADMIN — FENTANYL 50 MICROGRAM(S): 12 PATCH, EXTENDED RELEASE TRANSDERMAL at 10:14

## 2024-12-08 RX ADMIN — OXYCODONE HYDROCHLORIDE 10 MILLIGRAM(S): 30 TABLET ORAL at 16:07

## 2024-12-08 RX ADMIN — AMPICILLIN AND SULBACTAM 200 GRAM(S): 1; .5 INJECTION, POWDER, FOR SOLUTION INTRAVENOUS at 17:31

## 2024-12-08 RX ADMIN — CHLORHEXIDINE GLUCONATE 15 MILLILITER(S): 1.2 RINSE ORAL at 17:31

## 2024-12-08 RX ADMIN — POTASSIUM CHLORIDE 100 MILLIEQUIVALENT(S): 600 TABLET, EXTENDED RELEASE ORAL at 06:34

## 2024-12-08 RX ADMIN — OXYCODONE HYDROCHLORIDE 10 MILLIGRAM(S): 30 TABLET ORAL at 21:33

## 2024-12-08 RX ADMIN — POLYETHYLENE GLYCOL 3350 17 GRAM(S): 17 POWDER, FOR SOLUTION ORAL at 12:32

## 2024-12-08 RX ADMIN — Medication 108 MILLILITER(S): at 09:41

## 2024-12-08 RX ADMIN — AMPICILLIN AND SULBACTAM 200 GRAM(S): 1; .5 INJECTION, POWDER, FOR SOLUTION INTRAVENOUS at 00:48

## 2024-12-08 RX ADMIN — POTASSIUM CHLORIDE 100 MILLIEQUIVALENT(S): 600 TABLET, EXTENDED RELEASE ORAL at 09:41

## 2024-12-08 RX ADMIN — FENTANYL 50 MICROGRAM(S): 12 PATCH, EXTENDED RELEASE TRANSDERMAL at 11:04

## 2024-12-08 RX ADMIN — OXYCODONE HYDROCHLORIDE 10 MILLIGRAM(S): 30 TABLET ORAL at 22:05

## 2024-12-08 RX ADMIN — AMPICILLIN AND SULBACTAM 200 GRAM(S): 1; .5 INJECTION, POWDER, FOR SOLUTION INTRAVENOUS at 12:32

## 2024-12-08 RX ADMIN — OXYCODONE HYDROCHLORIDE 10 MILLIGRAM(S): 30 TABLET ORAL at 01:40

## 2024-12-08 RX ADMIN — OXYCODONE HYDROCHLORIDE 5 MILLIGRAM(S): 30 TABLET ORAL at 10:29

## 2024-12-08 RX ADMIN — CHLORHEXIDINE GLUCONATE 15 MILLILITER(S): 1.2 RINSE ORAL at 06:01

## 2024-12-08 RX ADMIN — OXYCODONE HYDROCHLORIDE 10 MILLIGRAM(S): 30 TABLET ORAL at 15:07

## 2024-12-08 RX ADMIN — AMPICILLIN AND SULBACTAM 200 GRAM(S): 1; .5 INJECTION, POWDER, FOR SOLUTION INTRAVENOUS at 06:01

## 2024-12-08 RX ADMIN — OXYCODONE HYDROCHLORIDE 10 MILLIGRAM(S): 30 TABLET ORAL at 02:10

## 2024-12-08 NOTE — PROGRESS NOTE ADULT - ASSESSMENT
25 y/o F presents as transfer to [facility] for right sided parapharyngeal abscess possibly secondary to a post operative gingival surgery infection    Recs:   - Plan for incision and drainage of right parapharyngeal space infection with extraction of any indicated teeth as add on this AM in OR  - NPO w/ IV maintenance fluids  - pre op labs, bhcg  - Chlorhexidine BID  - Multimodal pain control   - Ambulate as tolerated 25 y/o F presents as transfer to [facility] for right sided parapharyngeal abscess possibly secondary to a post operative gingival surgery infection    Recs:   - Plan for incision and drainage of right parapharyngeal space infection with extraction of any indicated teeth as add on this AM in OR  - NPO w/ IV maintenance fluids  - f/u pre op labs, bhcg  - Chlorhexidine BID  - Multimodal pain control   - Ambulate as tolerated  - Replete K after surgery today  - f/u cultures: 12/7 swab culture of purulence around #31 shows moderate G+ cocci in pairs, moderate G+ rods, numerous G- rods.

## 2024-12-08 NOTE — BRIEF OPERATIVE NOTE - OPERATION/FINDINGS
Jeremy purulence draining from site #31. Cultures taken of right lateral pharyngeal space abscess. Extraction of tooth #31. Placement of Penrose drain secured w/ 2-0 silk suture. Primary closure w/ 3-0 CGS. Hemostasis achieved.

## 2024-12-08 NOTE — PRE PROCEDURE NOTE - PRE PROCEDURE EVALUATION
24 year old female with right lateral pharyngeal space abscess secondary to operculectomy. She had tissue over tooth #31 - was sent to a periodontist who excised the tissue over the tooth about 1 week ago.  Two days later she developed pain, swelling and limited mouth opening and presented to the emergency room. CT scan was obtained which revealed a 2cm x2cm lateral pharyngeal space abscess. It was indicated to take her to the operating room for incision and drainage of the abscess and to extract the offending tooth, #31  due to arch length deficiency. ALl risks and  benefits of surgery were explained to the patient and her mother including pain, swelling bleeding and worsening infection. Consent was signed .

## 2024-12-08 NOTE — CHART NOTE - NSCHARTNOTEFT_GEN_A_CORE
24 F with no PMH now s/p I&D of right paraphrayngeal abscess via intraoral approach, extraction #31 and placement of 1x penrose drain.    Interval: Pt visited at 8S bedside 4 hours post op, pain well controlled, intraoral drain intact and draining serosanguinous drainage, right facial swelling consistent with procedure, tolerating soft diet    Physical Exam:   General: AAOx3, NAD  H: Normocephalic             Maxillofacial: No step deformities noted, tenderness to palpation of right facial edema consistent with post operative course             Intraoral: Intra-oral 1x penrose drain intact and drainaing, FOM soft/NT/NE, left buccal mucosa firm and tender, extraction site #31 hemostatic and sutures intact  E: PERRLA, EOMI  E: Hearing grossly intact, no otorrhea  N: No septal hematoma, no crepitus, no epistaxis  T: Trachea midline  Neuro: No CN V1-3 or VII neuro deficits.     Vitals:     Vital Signs Last 24 Hrs  T(C): 37.1 (08 Dec 2024 17:25), Max: 37.8 (08 Dec 2024 07:39)  T(F): 98.8 (08 Dec 2024 17:25), Max: 100.1 (08 Dec 2024 07:39)  HR: 65 (08 Dec 2024 17:25) (65 - 100)  BP: 120/62 (08 Dec 2024 17:25) (113/77 - 142/80)  BP(mean): 100 (08 Dec 2024 11:00) (92 - 100)  RR: 18 (08 Dec 2024 17:25) (15 - 20)  SpO2: 98% (08 Dec 2024 17:25) (97% - 100%)    Parameters below as of 08 Dec 2024 17:25  Patient On (Oxygen Delivery Method): room air        I&O's Detail    07 Dec 2024 07:01  -  08 Dec 2024 07:00  --------------------------------------------------------  IN:    Lactated Ringers: 972 mL    Oral Fluid: 10 mL  Total IN: 982 mL    OUT:    Voided (mL): 400 mL  Total OUT: 400 mL    Total NET: 582 mL          Medications:    MEDICATIONS  (STANDING):  ampicillin/sulbactam  IVPB      ampicillin/sulbactam  IVPB 3 Gram(s) IV Intermittent every 6 hours  chlorhexidine 0.12% Liquid 15 milliLiter(s) Oral Mucosa two times a day  influenza   Vaccine 0.5 milliLiter(s) IntraMuscular once  lactated ringers. 1000 milliLiter(s) (108 mL/Hr) IV Continuous <Continuous>  naloxone Injectable 0.4 milliGRAM(s) IV Push once  polyethylene glycol 3350 17 Gram(s) Oral daily  senna 2 Tablet(s) Oral at bedtime    MEDICATIONS  (PRN):  acetaminophen     Tablet .. 650 milliGRAM(s) Oral every 6 hours PRN Temp greater or equal to 38C (100.4F), Mild Pain (1 - 3)  bisacodyl 5 milliGRAM(s) Oral daily PRN Constipation  ibuprofen  Tablet. 600 milliGRAM(s) Oral every 6 hours PRN Mild Pain (1 - 3)  melatonin 3 milliGRAM(s) Oral at bedtime PRN Insomnia  metoclopramide Injectable 10 milliGRAM(s) IV Push once PRN n/v  ondansetron Injectable 4 milliGRAM(s) IV Push every 8 hours PRN Nausea and/or Vomiting  oxyCODONE    IR 10 milliGRAM(s) Oral every 4 hours PRN Severe Pain (7 - 10)  oxyCODONE    IR 5 milliGRAM(s) Oral every 4 hours PRN Moderate Pain (4 - 6)      Labs:                          11.7   8.25  )-----------( 177      ( 07 Dec 2024 11:39 )             33.6       12-07    140  |  107  |  12  ----------------------------<  85  3.3[L]   |  21[L]  |  0.64    Ca    8.3[L]      07 Dec 2024 11:39    TPro  5.7[L]  /  Alb  3.6  /  TBili  0.7  /  DBili  x   /  AST  10  /  ALT  36[H]  /  AlkPhos  72  12-07      Assessment:  24 F with no PMH now s/p I&D of right paraphrayngeal abscess via intraoral approach, extraction #31 and placement of 1x penrose drain on 12/8/24. Pt progressing well.    -continue IV unasyn  -continue multimodal pain management  -f/u cultures  -AM labs  -soft, bite sized diet  -drain removal and d/c pending AM rounds    Thomas Mayers  Oral & Maxillofacial Surgery   #[id number]  Available on Teams 24 F with no PMH now s/p I&D of right paraphrayngeal abscess via intraoral approach, extraction #31 and placement of 1x penrose drain.    Interval: Pt visited at 8S bedside 4 hours post op, brushing her teeth, pain well controlled, intraoral drain intact and draining serosanguinous drainage, right facial swelling consistent with procedure, tolerating soft diet    Physical Exam:   General: AAOx3, NAD  H: Normocephalic             Maxillofacial: No step deformities noted, tenderness to palpation of right facial edema consistent with post operative course             Intraoral: Intra-oral 1x penrose drain intact and drainaing, FOM soft/NT/NE, left buccal mucosa firm and tender, extraction site #31 hemostatic and sutures intact  E: PERRLA, EOMI  E: Hearing grossly intact, no otorrhea  N: No septal hematoma, no crepitus, no epistaxis  T: Trachea midline  Neuro: No CN V1-3 or VII neuro deficits.     Vitals:     Vital Signs Last 24 Hrs  T(C): 37.1 (08 Dec 2024 17:25), Max: 37.8 (08 Dec 2024 07:39)  T(F): 98.8 (08 Dec 2024 17:25), Max: 100.1 (08 Dec 2024 07:39)  HR: 65 (08 Dec 2024 17:25) (65 - 100)  BP: 120/62 (08 Dec 2024 17:25) (113/77 - 142/80)  BP(mean): 100 (08 Dec 2024 11:00) (92 - 100)  RR: 18 (08 Dec 2024 17:25) (15 - 20)  SpO2: 98% (08 Dec 2024 17:25) (97% - 100%)    Parameters below as of 08 Dec 2024 17:25  Patient On (Oxygen Delivery Method): room air        I&O's Detail    07 Dec 2024 07:01  -  08 Dec 2024 07:00  --------------------------------------------------------  IN:    Lactated Ringers: 972 mL    Oral Fluid: 10 mL  Total IN: 982 mL    OUT:    Voided (mL): 400 mL  Total OUT: 400 mL    Total NET: 582 mL          Medications:    MEDICATIONS  (STANDING):  ampicillin/sulbactam  IVPB      ampicillin/sulbactam  IVPB 3 Gram(s) IV Intermittent every 6 hours  chlorhexidine 0.12% Liquid 15 milliLiter(s) Oral Mucosa two times a day  influenza   Vaccine 0.5 milliLiter(s) IntraMuscular once  lactated ringers. 1000 milliLiter(s) (108 mL/Hr) IV Continuous <Continuous>  naloxone Injectable 0.4 milliGRAM(s) IV Push once  polyethylene glycol 3350 17 Gram(s) Oral daily  senna 2 Tablet(s) Oral at bedtime    MEDICATIONS  (PRN):  acetaminophen     Tablet .. 650 milliGRAM(s) Oral every 6 hours PRN Temp greater or equal to 38C (100.4F), Mild Pain (1 - 3)  bisacodyl 5 milliGRAM(s) Oral daily PRN Constipation  ibuprofen  Tablet. 600 milliGRAM(s) Oral every 6 hours PRN Mild Pain (1 - 3)  melatonin 3 milliGRAM(s) Oral at bedtime PRN Insomnia  metoclopramide Injectable 10 milliGRAM(s) IV Push once PRN n/v  ondansetron Injectable 4 milliGRAM(s) IV Push every 8 hours PRN Nausea and/or Vomiting  oxyCODONE    IR 10 milliGRAM(s) Oral every 4 hours PRN Severe Pain (7 - 10)  oxyCODONE    IR 5 milliGRAM(s) Oral every 4 hours PRN Moderate Pain (4 - 6)      Labs:                          11.7   8.25  )-----------( 177      ( 07 Dec 2024 11:39 )             33.6       12-07    140  |  107  |  12  ----------------------------<  85  3.3[L]   |  21[L]  |  0.64    Ca    8.3[L]      07 Dec 2024 11:39    TPro  5.7[L]  /  Alb  3.6  /  TBili  0.7  /  DBili  x   /  AST  10  /  ALT  36[H]  /  AlkPhos  72  12-07      Assessment:  24 F with no PMH now s/p I&D of right paraphrayngeal abscess via intraoral approach, extraction #31 and placement of 1x penrose drain on 12/8/24. Pt progressing well.    -continue IV unasyn  -continue multimodal pain management  -f/u cultures  -AM labs  -soft, bite sized diet  -drain removal and d/c pending AM rounds    Thomas Mayers  Oral & Maxillofacial Surgery   #[id number]  Available on Teams

## 2024-12-08 NOTE — BRIEF OPERATIVE NOTE - NSICDXBRIEFPROCEDURE_GEN_ALL_CORE_FT
PROCEDURES:  Incision and drainage of abscess of mandible 08-Dec-2024 09:36:45  Alessandro Garcia  Extraction of molar 08-Dec-2024 09:37:29  Alessandro Garcia

## 2024-12-09 LAB
ANION GAP SERPL CALC-SCNC: 13 MMOL/L — SIGNIFICANT CHANGE UP (ref 7–14)
BUN SERPL-MCNC: 10 MG/DL — SIGNIFICANT CHANGE UP (ref 7–23)
CALCIUM SERPL-MCNC: 8.7 MG/DL — SIGNIFICANT CHANGE UP (ref 8.4–10.5)
CHLORIDE SERPL-SCNC: 101 MMOL/L — SIGNIFICANT CHANGE UP (ref 98–107)
CO2 SERPL-SCNC: 22 MMOL/L — SIGNIFICANT CHANGE UP (ref 22–31)
CREAT SERPL-MCNC: 0.55 MG/DL — SIGNIFICANT CHANGE UP (ref 0.5–1.3)
EGFR: 130 ML/MIN/1.73M2 — SIGNIFICANT CHANGE UP
GLUCOSE SERPL-MCNC: 98 MG/DL — SIGNIFICANT CHANGE UP (ref 70–99)
HCT VFR BLD CALC: 33.4 % — LOW (ref 34.5–45)
HGB BLD-MCNC: 12.2 G/DL — SIGNIFICANT CHANGE UP (ref 11.5–15.5)
MAGNESIUM SERPL-MCNC: 1.9 MG/DL — SIGNIFICANT CHANGE UP (ref 1.6–2.6)
MCHC RBC-ENTMCNC: 29.7 PG — SIGNIFICANT CHANGE UP (ref 27–34)
MCHC RBC-ENTMCNC: 36.5 G/DL — HIGH (ref 32–36)
MCV RBC AUTO: 81.3 FL — SIGNIFICANT CHANGE UP (ref 80–100)
NRBC # BLD: 0 /100 WBCS — SIGNIFICANT CHANGE UP (ref 0–0)
NRBC # FLD: 0 K/UL — SIGNIFICANT CHANGE UP (ref 0–0)
PLATELET # BLD AUTO: 248 K/UL — SIGNIFICANT CHANGE UP (ref 150–400)
POTASSIUM SERPL-MCNC: 4 MMOL/L — SIGNIFICANT CHANGE UP (ref 3.5–5.3)
POTASSIUM SERPL-MCNC: 4 MMOL/L — SIGNIFICANT CHANGE UP (ref 3.5–5.3)
POTASSIUM SERPL-SCNC: 4 MMOL/L — SIGNIFICANT CHANGE UP (ref 3.5–5.3)
POTASSIUM SERPL-SCNC: 4 MMOL/L — SIGNIFICANT CHANGE UP (ref 3.5–5.3)
RBC # BLD: 4.11 M/UL — SIGNIFICANT CHANGE UP (ref 3.8–5.2)
RBC # FLD: 12.4 % — SIGNIFICANT CHANGE UP (ref 10.3–14.5)
SODIUM SERPL-SCNC: 136 MMOL/L — SIGNIFICANT CHANGE UP (ref 135–145)
WBC # BLD: 9.97 K/UL — SIGNIFICANT CHANGE UP (ref 3.8–10.5)
WBC # FLD AUTO: 9.97 K/UL — SIGNIFICANT CHANGE UP (ref 3.8–10.5)

## 2024-12-09 RX ORDER — POTASSIUM CHLORIDE 600 MG/1
40 TABLET, EXTENDED RELEASE ORAL EVERY 4 HOURS
Refills: 0 | Status: COMPLETED | OUTPATIENT
Start: 2024-12-09 | End: 2024-12-09

## 2024-12-09 RX ADMIN — AMPICILLIN AND SULBACTAM 200 GRAM(S): 1; .5 INJECTION, POWDER, FOR SOLUTION INTRAVENOUS at 17:17

## 2024-12-09 RX ADMIN — POLYETHYLENE GLYCOL 3350 17 GRAM(S): 17 POWDER, FOR SOLUTION ORAL at 12:21

## 2024-12-09 RX ADMIN — POTASSIUM CHLORIDE 40 MILLIEQUIVALENT(S): 600 TABLET, EXTENDED RELEASE ORAL at 07:11

## 2024-12-09 RX ADMIN — CHLORHEXIDINE GLUCONATE 15 MILLILITER(S): 1.2 RINSE ORAL at 17:17

## 2024-12-09 RX ADMIN — Medication 600 MILLIGRAM(S): at 23:38

## 2024-12-09 RX ADMIN — OXYCODONE HYDROCHLORIDE 10 MILLIGRAM(S): 30 TABLET ORAL at 05:38

## 2024-12-09 RX ADMIN — POTASSIUM CHLORIDE 40 MILLIEQUIVALENT(S): 600 TABLET, EXTENDED RELEASE ORAL at 12:21

## 2024-12-09 RX ADMIN — OXYCODONE HYDROCHLORIDE 10 MILLIGRAM(S): 30 TABLET ORAL at 05:08

## 2024-12-09 RX ADMIN — CHLORHEXIDINE GLUCONATE 15 MILLILITER(S): 1.2 RINSE ORAL at 05:08

## 2024-12-09 RX ADMIN — AMPICILLIN AND SULBACTAM 200 GRAM(S): 1; .5 INJECTION, POWDER, FOR SOLUTION INTRAVENOUS at 12:21

## 2024-12-09 RX ADMIN — AMPICILLIN AND SULBACTAM 200 GRAM(S): 1; .5 INJECTION, POWDER, FOR SOLUTION INTRAVENOUS at 23:23

## 2024-12-09 RX ADMIN — Medication 2 TABLET(S): at 21:23

## 2024-12-09 RX ADMIN — AMPICILLIN AND SULBACTAM 200 GRAM(S): 1; .5 INJECTION, POWDER, FOR SOLUTION INTRAVENOUS at 05:06

## 2024-12-09 RX ADMIN — POTASSIUM CHLORIDE 40 MILLIEQUIVALENT(S): 600 TABLET, EXTENDED RELEASE ORAL at 15:25

## 2024-12-09 RX ADMIN — AMPICILLIN AND SULBACTAM 200 GRAM(S): 1; .5 INJECTION, POWDER, FOR SOLUTION INTRAVENOUS at 00:59

## 2024-12-09 NOTE — PROVIDER CONTACT NOTE (OTHER) - ASSESSMENT
VSS, asymptomatic with no s&s of respiratory distress. Pt constantly taking off pulse ox. Pt disconnects trach tubing from trach collar. Pt educated on the pulse of monitoring the 02 saturation. Pt educated on the use of oxygen and humidification through the trach collar. Pt refuses deep suction, but will use oral suction.

## 2024-12-09 NOTE — PROVIDER CONTACT NOTE (CRITICAL VALUE NOTIFICATION) - SITUATION
Moderate polymorphonuclear leukocytes per low power field. Numerous gram variable rods per oil field. Numerous gram variable rods per oil power field. Moderate gram positive cocci in pairs and chains per oil power field

## 2024-12-09 NOTE — PROGRESS NOTE ADULT - SUBJECTIVE AND OBJECTIVE BOX
[facility] Progress Note:    OVERNIGHT EVENTS: NAEO    SUBJECTIVE: Pt seen and examined at bedside. Patient comfortable and in no-apparent distress. Pain is controlled. Tolerating secretions. Tolerating full liquid diet.     MEDICATIONS  (STANDING):  ampicillin/sulbactam  IVPB      ampicillin/sulbactam  IVPB 3 Gram(s) IV Intermittent every 6 hours  chlorhexidine 0.12% Liquid 15 milliLiter(s) Oral Mucosa two times a day  influenza   Vaccine 0.5 milliLiter(s) IntraMuscular once  lactated ringers. 1000 milliLiter(s) (108 mL/Hr) IV Continuous <Continuous>  naloxone Injectable 0.4 milliGRAM(s) IV Push once  polyethylene glycol 3350 17 Gram(s) Oral daily  potassium chloride    Tablet ER 40 milliEquivalent(s) Oral every 4 hours  senna 2 Tablet(s) Oral at bedtime    MEDICATIONS  (PRN):  bisacodyl 5 milliGRAM(s) Oral daily PRN Constipation  ibuprofen  Tablet. 600 milliGRAM(s) Oral every 6 hours PRN Mild Pain (1 - 3)  melatonin 3 milliGRAM(s) Oral at bedtime PRN Insomnia  metoclopramide Injectable 10 milliGRAM(s) IV Push once PRN n/v  ondansetron Injectable 4 milliGRAM(s) IV Push every 8 hours PRN Nausea and/or Vomiting  oxycodone    5 mG/acetaminophen 325 mG 1 Tablet(s) Oral every 4 hours PRN Severe Pain (7 - 10)    T(C): 36.9 (12-09-24 @ 05:08), Max: 37.5 (12-08-24 @ 11:00)  HR: 79 (12-09-24 @ 05:08) (65 - 93)  BP: 115/62 (12-09-24 @ 05:08) (113/77 - 142/80)  RR: 17 (12-09-24 @ 05:08) (15 - 20)  SpO2: 98% (12-09-24 @ 05:08) (98% - 100%)    12-08-24 @ 07:01  -  12-09-24 @ 07:00  --------------------------------------------------------  IN: 1796 mL / OUT: 400 mL / NET: 1396 mL      LABS:                        12.2   9.97  )-----------( 248      ( 09 Dec 2024 06:57 )             33.4     12-09    136  |  101  |  10  ----------------------------<  98  4.0   |  22  |  0.55    Ca    8.7      09 Dec 2024 06:57  Mg     1.90     12-09    TPro  5.7[L]  /  Alb  3.6  /  TBili  0.7  /  DBili  x   /  AST  10  /  ALT  36[H]  /  AlkPhos  72  12-07    PT/INR - ( 07 Dec 2024 19:15 )   PT: 13.4 sec;   INR: 1.16 ratio         PTT - ( 07 Dec 2024 19:15 )  PTT:31.3 sec  Urinalysis Basic - ( 09 Dec 2024 06:57 )    Color: x / Appearance: x / SG: x / pH: x  Gluc: 98 mg/dL / Ketone: x  / Bili: x / Urobili: x   Blood: x / Protein: x / Nitrite: x   Leuk Esterase: x / RBC: x / WBC x   Sq Epi: x / Non Sq Epi: x / Bacteria: x      PE:  Gen: NAD  CV: Pulse regular present  Resp: Nonlabored  Mild R facial swelling, improved from day prior  R lateral pharyngeal Penrose drain in place w/ minimal purulent d/c  MORALES~15mm w/ gaurding, extraction site #31 hemostatic
Patient is a 24y old  Female who presents with a chief complaint of Right parapharyngeal space infection (07 Dec 2024 17:40)    Interval events: MELYON, HALEY on RA  PAST MEDICAL & SURGICAL HISTORY:    MEDICATIONS  (STANDING):  ampicillin/sulbactam  IVPB      ampicillin/sulbactam  IVPB 3 Gram(s) IV Intermittent every 6 hours  chlorhexidine 0.12% Liquid 15 milliLiter(s) Oral Mucosa two times a day  influenza   Vaccine 0.5 milliLiter(s) IntraMuscular once  lactated ringers. 1000 milliLiter(s) (108 mL/Hr) IV Continuous <Continuous>  naloxone Injectable 0.4 milliGRAM(s) IV Push once  polyethylene glycol 3350 17 Gram(s) Oral daily  potassium chloride  10 mEq/100 mL IVPB 10 milliEquivalent(s) IV Intermittent every 1 hour  senna 2 Tablet(s) Oral at bedtime    MEDICATIONS  (PRN):  acetaminophen     Tablet .. 650 milliGRAM(s) Oral every 6 hours PRN Temp greater or equal to 38C (100.4F), Mild Pain (1 - 3)  bisacodyl 5 milliGRAM(s) Oral daily PRN Constipation  ibuprofen  Tablet. 600 milliGRAM(s) Oral every 6 hours PRN Mild Pain (1 - 3)  melatonin 3 milliGRAM(s) Oral at bedtime PRN Insomnia  metoclopramide Injectable 10 milliGRAM(s) IV Push once PRN n/v  ondansetron Injectable 4 milliGRAM(s) IV Push every 8 hours PRN Nausea and/or Vomiting  oxyCODONE    IR 10 milliGRAM(s) Oral every 4 hours PRN Severe Pain (7 - 10)  oxyCODONE    IR 5 milliGRAM(s) Oral every 4 hours PRN Moderate Pain (4 - 6)    Allergies    No Known Allergies    Intolerances      FAMILY HISTORY:    *SOCIAL HISTORY: Denies ETOH use, Tobacco, drugs    * Review of Systems: <<Denies>> fever, chills. <<Denies>> LOC. <<Denies>> Nausea/vomiting/headache. <<Denies>> CP, SOB, cough. <<Denies>> palpitations. <<Denies>> blurred vision/double vision. <<Denies>> dysphagia, dyspnea. <<Denies>> paresthesia.       Vital Signs Last 24 Hrs  T(C): 37.6 (08 Dec 2024 01:37), Max: 37.6 (07 Dec 2024 10:54)  T(F): 99.7 (08 Dec 2024 01:37), Max: 99.7 (07 Dec 2024 10:54)  HR: 96 (08 Dec 2024 01:37) (70 - 96)  BP: 115/70 (08 Dec 2024 01:37) (115/70 - 127/81)  BP(mean): --  RR: 18 (08 Dec 2024 01:37) (16 - 18)  SpO2: 98% (08 Dec 2024 01:37) (96% - 99%)    Parameters below as of 08 Dec 2024 01:37  Patient On (Oxygen Delivery Method): room air        Physical Exam:  Gen: AAox3, NAD, NC/AT  CONSTITUTIONAL:  No weakness, fevers or chills  H: Atraumatic. The laceration was hemostatic in nature and without the presence of any foreign objects or debris within the laceration. CN V1, V2, V3 grossly intact bilaterally (+pinprick, +brush stroke) with no evidence of CN7 nerve weakness. Inferior border of the mandible is palpable bilaterally. MORALES 10 mm, indurated edema of right submandibular area, skin over right neck feels warm and indurated. Right buccal slight edema. All edematous areas are painful to palpation.     E: + PERRLA. EOMI with no visual disturbances or signs of orbital trauma. No palpable step defects to the orbital rims bilaterally. No periorbital edema, subconjunctival hemorrhage, hyphema, chemosis or periorbital ecchymosis noted bilaterally.      E: No otorrhea, tinnitus, or Argueta's sign.      N: Nasal dorsum is midline with no cosmetic deformity. No edema, mobility or crepitus to nasal dorsum. Nares patent bilaterally with no dried blood, rhinorrhea, active epistaxis or septal hematoma.      T: Trachea is midline with no palpable masses     Intraoral Exam:   No FOM elevation, FOM, small amount of purulence noted extruding from distal gingiva of tooth #31 when palpated. Limited examination possible due to MORALES ~15 mm. No vestibular swelling noted.      LABS:                        11.7   8.25  )-----------( 177      ( 07 Dec 2024 11:39 )             33.6     12-07    140  |  107  |  12  ----------------------------<  85  3.3[L]   |  21[L]  |  0.64    Ca    8.3[L]      07 Dec 2024 11:39    TPro  5.7[L]  /  Alb  3.6  /  TBili  0.7  /  DBili  x   /  AST  10  /  ALT  36[H]  /  AlkPhos  72  12-07    Urinalysis Basic - ( 07 Dec 2024 11:39 )    Color: x / Appearance: x / SG: x / pH: x  Gluc: 85 mg/dL / Ketone: x  / Bili: x / Urobili: x   Blood: x / Protein: x / Nitrite: x   Leuk Esterase: x / RBC: x / WBC x   Sq Epi: x / Non Sq Epi: x / Bacteria: x      PT/INR - ( 07 Dec 2024 19:15 )   PT: 13.4 sec;   INR: 1.16 ratio         PTT - ( 07 Dec 2024 19:15 )  PTT:31.3 sec        Imaging:    CT Neck w/ contrast: 12/7/24: IMPRESSION: 1. Right submandibular inflammation with ductal dilation but no obstructing calcification appreciated. 2. Right parapharyngeal abscess measuring 2.4x1.8x2.6 cm. This abuts the superior aspect of the inflamed submandibular gland.

## 2024-12-09 NOTE — PROVIDER CONTACT NOTE (CRITICAL VALUE NOTIFICATION) - TEST AND RESULT REPORTED:
Moderate polymorphonuclear leukocytes per low power field. Numerous gram variable rods per oil field . Numerous gram variable rods per oil power field. Moderate gram positive cocci in pairs and chains per oil power field
11-06 Na136 mmol/L Glu 174 mg/dL<H> K+ 4.2 mmol/L Cr  0.83 mg/dL BUN 17 mg/dL 11-06 Phos 2.9 mg/dL 11-06 Alb 2.7 g/dL<L> 11-05 [id number] 10.9 %<H> 11-05 Chol 179 mg/dL  mg/dL HDL 45 mg/dL Trig 99 mg/dL

## 2024-12-09 NOTE — PROGRESS NOTE ADULT - ASSESSMENT
24F 1-week s/p operculectomy site #31 complicated by lateral pharyngeal space abscess now s/p I&D R lateral pharyngeal + exo #31 (12/8) - progressing well.  -Advance to soft nonchew diet  -Penrose drain to be removed this evening   -Encourage ambulation  -Tentatively plan for d/c tomorrow AM 24F 1-week s/p operculectomy site #31 complicated by lateral pharyngeal space abscess now s/p I&D R lateral pharyngeal + exo #31 (12/8) - progressing well.  -Advance to soft nonchew diet  -IV Unasyn  -Penrose drain to be removed this evening   -Encourage ambulation  -Tentatively plan for d/c tomorrow AM

## 2024-12-10 ENCOUNTER — TRANSCRIPTION ENCOUNTER (OUTPATIENT)
Age: 24
End: 2024-12-10

## 2024-12-10 VITALS
OXYGEN SATURATION: 100 % | RESPIRATION RATE: 18 BRPM | TEMPERATURE: 98 F | SYSTOLIC BLOOD PRESSURE: 104 MMHG | DIASTOLIC BLOOD PRESSURE: 63 MMHG | HEART RATE: 70 BPM

## 2024-12-10 LAB
ANION GAP SERPL CALC-SCNC: 12 MMOL/L — SIGNIFICANT CHANGE UP (ref 7–14)
BUN SERPL-MCNC: 10 MG/DL — SIGNIFICANT CHANGE UP (ref 7–23)
CALCIUM SERPL-MCNC: 8.8 MG/DL — SIGNIFICANT CHANGE UP (ref 8.4–10.5)
CHLORIDE SERPL-SCNC: 104 MMOL/L — SIGNIFICANT CHANGE UP (ref 98–107)
CO2 SERPL-SCNC: 22 MMOL/L — SIGNIFICANT CHANGE UP (ref 22–31)
CREAT SERPL-MCNC: 0.55 MG/DL — SIGNIFICANT CHANGE UP (ref 0.5–1.3)
CULTURE RESULTS: ABNORMAL
EGFR: 130 ML/MIN/1.73M2 — SIGNIFICANT CHANGE UP
GLUCOSE SERPL-MCNC: 82 MG/DL — SIGNIFICANT CHANGE UP (ref 70–99)
HCT VFR BLD CALC: 33.8 % — LOW (ref 34.5–45)
HGB BLD-MCNC: 11.9 G/DL — SIGNIFICANT CHANGE UP (ref 11.5–15.5)
MAGNESIUM SERPL-MCNC: 1.9 MG/DL — SIGNIFICANT CHANGE UP (ref 1.6–2.6)
MCHC RBC-ENTMCNC: 29.5 PG — SIGNIFICANT CHANGE UP (ref 27–34)
MCHC RBC-ENTMCNC: 35.2 G/DL — SIGNIFICANT CHANGE UP (ref 32–36)
MCV RBC AUTO: 83.7 FL — SIGNIFICANT CHANGE UP (ref 80–100)
NRBC # BLD: 0 /100 WBCS — SIGNIFICANT CHANGE UP (ref 0–0)
NRBC # FLD: 0 K/UL — SIGNIFICANT CHANGE UP (ref 0–0)
PHOSPHATE SERPL-MCNC: 3.1 MG/DL — SIGNIFICANT CHANGE UP (ref 2.5–4.5)
PLATELET # BLD AUTO: 228 K/UL — SIGNIFICANT CHANGE UP (ref 150–400)
POTASSIUM SERPL-MCNC: 3.9 MMOL/L — SIGNIFICANT CHANGE UP (ref 3.5–5.3)
POTASSIUM SERPL-SCNC: 3.9 MMOL/L — SIGNIFICANT CHANGE UP (ref 3.5–5.3)
RBC # BLD: 4.04 M/UL — SIGNIFICANT CHANGE UP (ref 3.8–5.2)
RBC # FLD: 13 % — SIGNIFICANT CHANGE UP (ref 10.3–14.5)
SODIUM SERPL-SCNC: 138 MMOL/L — SIGNIFICANT CHANGE UP (ref 135–145)
SPECIMEN SOURCE: SIGNIFICANT CHANGE UP
WBC # BLD: 5.89 K/UL — SIGNIFICANT CHANGE UP (ref 3.8–10.5)
WBC # FLD AUTO: 5.89 K/UL — SIGNIFICANT CHANGE UP (ref 3.8–10.5)

## 2024-12-10 RX ORDER — AMOXICILLIN/POTASSIUM CLAV 250-125 MG
875 TABLET ORAL
Qty: 14 | Refills: 0
Start: 2024-12-10 | End: 2024-12-16

## 2024-12-10 RX ORDER — CHLORHEXIDINE GLUCONATE 1.2 MG/ML
15 RINSE ORAL
Qty: 1 | Refills: 0
Start: 2024-12-10

## 2024-12-10 RX ORDER — OXYCODONE AND ACETAMINOPHEN 5; 325 MG/1; MG/1
1 TABLET ORAL
Qty: 9 | Refills: 0
Start: 2024-12-10 | End: 2024-12-12

## 2024-12-10 RX ADMIN — AMPICILLIN AND SULBACTAM 200 GRAM(S): 1; .5 INJECTION, POWDER, FOR SOLUTION INTRAVENOUS at 05:14

## 2024-12-10 RX ADMIN — CHLORHEXIDINE GLUCONATE 15 MILLILITER(S): 1.2 RINSE ORAL at 05:14

## 2024-12-10 RX ADMIN — Medication 600 MILLIGRAM(S): at 00:10

## 2024-12-10 NOTE — DISCHARGE NOTE NURSING/CASE MANAGEMENT/SOCIAL WORK - FINANCIAL ASSISTANCE
[facility] provides services at a reduced cost to those who are determined to be eligible through [facility]’s financial assistance program. Information regarding [facility]’s financial assistance program can be found by going to [email or web address] or by calling [phone].

## 2024-12-10 NOTE — PROGRESS NOTE PEDS - ASSESSMENT
24F 1-week s/p operculectomy site #31 complicated by lateral pharyngeal space abscess now s/p I&D R lateral pharyngeal + exo #31 (12/8) - progressing well w/ penrose drains now removed.     -soft nonchew  -d/c today   -Rx sent to home pharmacy for Augmentin 875 BID x7d, Percocet x 9 tabs, Peridex BID x14d  -f/u w/ Dr. Torres in her office in 1-week at [facility] or [employer] office- info below     Destini Torres DDS, MD  [place] [address]   [address]   [place] [address]  [phone]    [place]  [address] [place] [address]  [phone]

## 2024-12-10 NOTE — DISCHARGE NOTE NURSING/CASE MANAGEMENT/SOCIAL WORK - NSDCPEFALRISK_GEN_ALL_CORE
For information on Fall & Injury Prevention, visit: [email or web address] OR  [email or web address] OR  https://www.cdc.gov/steadi/patient.html

## 2024-12-10 NOTE — DISCHARGE NOTE PROVIDER - HOSPITAL COURSE
12/7/24 - Patient presented with R sided parapharyngeal space abscess and was admitted to [facility]  12/8/24 - Patient s/p I&D of R lateral pharyngeal space abscess and extraction of tooth #31  12/9/24 - Patient comfortable and in no-apparent distress. Pain is controlled. Tolerating secretions. Tolerating full liquid diet.  12/10/24 - Patient deemed stable for discharge

## 2024-12-10 NOTE — DISCHARGE NOTE PROVIDER - CARE PROVIDER_API CALL
Destini Torres  Oral/Maxillofacial Surgery  [address], [address]  [place] [address]  Phone: [phone]  Fax: [phone]  Follow Up Time: 1 week

## 2024-12-10 NOTE — DISCHARGE NOTE NURSING/CASE MANAGEMENT/SOCIAL WORK - PATIENT PORTAL LINK FT
You can access the FollowMyHealth Patient Portal offered by [facility] by registering at the following website: [email or web address]. By joining [employer]’s FollowMyHealth portal, you will also be able to view your health information using other applications (apps) compatible with our system.

## 2024-12-10 NOTE — PROGRESS NOTE PEDS - SUBJECTIVE AND OBJECTIVE BOX
Surgery Progress Note:    OVERNIGHT EVENTS: NAEO    SUBJECTIVE: Pt seen and examined at bedside. Patient comfortable and in no-apparent distress. Pain is controlled. Tolerating soft, nonchew diet.    MEDICATIONS  (STANDING):  ampicillin/sulbactam  IVPB      ampicillin/sulbactam  IVPB 3 Gram(s) IV Intermittent every 6 hours  chlorhexidine 0.12% Liquid 15 milliLiter(s) Oral Mucosa two times a day  influenza   Vaccine 0.5 milliLiter(s) IntraMuscular once  naloxone Injectable 0.4 milliGRAM(s) IV Push once  polyethylene glycol 3350 17 Gram(s) Oral daily  senna 2 Tablet(s) Oral at bedtime    MEDICATIONS  (PRN):  bisacodyl 5 milliGRAM(s) Oral daily PRN Constipation  ibuprofen  Tablet. 600 milliGRAM(s) Oral every 6 hours PRN Mild Pain (1 - 3)  melatonin 3 milliGRAM(s) Oral at bedtime PRN Insomnia  metoclopramide Injectable 10 milliGRAM(s) IV Push once PRN n/v  ondansetron Injectable 4 milliGRAM(s) IV Push every 8 hours PRN Nausea and/or Vomiting  oxycodone    5 mG/acetaminophen 325 mG 1 Tablet(s) Oral every 4 hours PRN Severe Pain (7 - 10)    T(C): 36.8 (12-10-24 @ 05:10), Max: 37.1 (12-09-24 @ 22:10)  HR: 72 (12-10-24 @ 05:10) (66 - 82)  BP: 111/62 (12-10-24 @ 05:10) (106/57 - 121/79)  RR: 19 (12-10-24 @ 05:10) (16 - 19)  SpO2: 98% (12-10-24 @ 05:10) (97% - 100%)    12-09-24 @ 07:01  -  12-10-24 @ 07:00  --------------------------------------------------------  IN: 900 mL / OUT: 0 mL / NET: 900 mL      LABS:                        11.9   5.89  )-----------( 228      ( 10 Dec 2024 05:57 )             33.8     12-10    138  |  104  |  10  ----------------------------<  82  3.9   |  22  |  0.55    Ca    8.8      10 Dec 2024 05:57  Phos  3.1     12-10  Mg     1.90     12-10        Urinalysis Basic - ( 10 Dec 2024 05:57 )    Color: x / Appearance: x / SG: x / pH: x  Gluc: 82 mg/dL / Ketone: x  / Bili: x / Urobili: x   Blood: x / Protein: x / Nitrite: x   Leuk Esterase: x / RBC: x / WBC x   Sq Epi: x / Non Sq Epi: x / Bacteria: x      PE:  Gen: NAD  CV: Pulse regular present  Resp: Nonlabored  Abdomen: Soft, nontender Surgery Progress Note:    OVERNIGHT EVENTS: NAEO    SUBJECTIVE: Pt seen and examined at bedside. Patient comfortable and in no-apparent distress. Pain is controlled. Tolerating soft, nonchew diet.    MEDICATIONS  (STANDING):  ampicillin/sulbactam  IVPB      ampicillin/sulbactam  IVPB 3 Gram(s) IV Intermittent every 6 hours  chlorhexidine 0.12% Liquid 15 milliLiter(s) Oral Mucosa two times a day  influenza   Vaccine 0.5 milliLiter(s) IntraMuscular once  naloxone Injectable 0.4 milliGRAM(s) IV Push once  polyethylene glycol 3350 17 Gram(s) Oral daily  senna 2 Tablet(s) Oral at bedtime    MEDICATIONS  (PRN):  bisacodyl 5 milliGRAM(s) Oral daily PRN Constipation  ibuprofen  Tablet. 600 milliGRAM(s) Oral every 6 hours PRN Mild Pain (1 - 3)  melatonin 3 milliGRAM(s) Oral at bedtime PRN Insomnia  metoclopramide Injectable 10 milliGRAM(s) IV Push once PRN n/v  ondansetron Injectable 4 milliGRAM(s) IV Push every 8 hours PRN Nausea and/or Vomiting  oxycodone    5 mG/acetaminophen 325 mG 1 Tablet(s) Oral every 4 hours PRN Severe Pain (7 - 10)    T(C): 36.8 (12-10-24 @ 05:10), Max: 37.1 (12-09-24 @ 22:10)  HR: 72 (12-10-24 @ 05:10) (66 - 82)  BP: 111/62 (12-10-24 @ 05:10) (106/57 - 121/79)  RR: 19 (12-10-24 @ 05:10) (16 - 19)  SpO2: 98% (12-10-24 @ 05:10) (97% - 100%)    12-09-24 @ 07:01  -  12-10-24 @ 07:00  --------------------------------------------------------  IN: 900 mL / OUT: 0 mL / NET: 900 mL      LABS:                        11.9   5.89  )-----------( 228      ( 10 Dec 2024 05:57 )             33.8     12-10    138  |  104  |  10  ----------------------------<  82  3.9   |  22  |  0.55    Ca    8.8      10 Dec 2024 05:57  Phos  3.1     12-10  Mg     1.90     12-10        Urinalysis Basic - ( 10 Dec 2024 05:57 )    Color: x / Appearance: x / SG: x / pH: x  Gluc: 82 mg/dL / Ketone: x  / Bili: x / Urobili: x   Blood: x / Protein: x / Nitrite: x   Leuk Esterase: x / RBC: x / WBC x   Sq Epi: x / Non Sq Epi: x / Bacteria: x      PE:  Gen: NAD  CV: Pulse regular present  Resp: Nonlabored  R lateral pharyngeal Penrose drain in place w/ minimal purulent d/c  MORALES~15mm w/ gaurding, extraction site #31 hemostatic

## 2024-12-10 NOTE — DISCHARGE NOTE PROVIDER - NSDCCPCAREPLAN_GEN_ALL_CORE_FT
PRINCIPAL DISCHARGE DIAGNOSIS  Diagnosis: Abscess of parapharyngeal space  Assessment and Plan of Treatment:

## 2024-12-10 NOTE — DISCHARGE NOTE PROVIDER - NSDCMRMEDTOKEN_GEN_ALL_CORE_FT
amoxicillin-clavulanate 875 mg-125 mg oral tablet: 875 milligram(s) orally 2 times a day  Percocet 5 mg-325 mg oral tablet: 1 tab(s) orally every 8 hours MDD: 3 tabs  Peridex 0.12% mucous membrane liquid: 15 milliliter(s) orally 2 times a day

## 2024-12-13 LAB
CULTURE RESULTS: ABNORMAL
SPECIMEN SOURCE: SIGNIFICANT CHANGE UP

## 2024-12-17 LAB — SURGICAL PATHOLOGY STUDY: SIGNIFICANT CHANGE UP

## 2025-01-10 ENCOUNTER — OUTPATIENT (OUTPATIENT)
Dept: OUTPATIENT SERVICES | Facility: HOSPITAL | Age: 25
LOS: 1 days | Discharge: HOME | End: 2025-01-10

## 2025-01-10 DIAGNOSIS — D72.818 OTHER DECREASED WHITE BLOOD CELL COUNT: ICD-10-CM

## 2025-01-13 ENCOUNTER — RESULT REVIEW (OUTPATIENT)
Age: 25
End: 2025-01-13

## 2025-01-13 ENCOUNTER — APPOINTMENT (OUTPATIENT)
Dept: HEMATOLOGY ONCOLOGY | Facility: CLINIC | Age: 25
End: 2025-01-13
Payer: COMMERCIAL

## 2025-01-13 VITALS
BODY MASS INDEX: 26.13 KG/M2 | HEART RATE: 78 BPM | WEIGHT: 147.49 LBS | OXYGEN SATURATION: 97 % | TEMPERATURE: 97.4 F | RESPIRATION RATE: 17 BRPM | DIASTOLIC BLOOD PRESSURE: 81 MMHG | SYSTOLIC BLOOD PRESSURE: 121 MMHG

## 2025-01-13 DIAGNOSIS — D72.818 OTHER DECREASED WHITE BLOOD CELL COUNT: ICD-10-CM

## 2025-01-13 LAB
BASOPHILS # BLD AUTO: 0.04 K/UL — SIGNIFICANT CHANGE UP (ref 0–0.2)
BASOPHILS NFR BLD AUTO: 1 % — SIGNIFICANT CHANGE UP (ref 0–2)
EOSINOPHIL # BLD AUTO: 0.05 K/UL — SIGNIFICANT CHANGE UP (ref 0–0.5)
EOSINOPHIL NFR BLD AUTO: 1.2 % — SIGNIFICANT CHANGE UP (ref 0–6)
HCT VFR BLD CALC: 37.7 % — SIGNIFICANT CHANGE UP (ref 34.5–45)
HGB BLD-MCNC: 13.1 G/DL — SIGNIFICANT CHANGE UP (ref 11.5–15.5)
IMM GRANULOCYTES NFR BLD AUTO: 0.2 % — SIGNIFICANT CHANGE UP (ref 0–0.9)
LYMPHOCYTES # BLD AUTO: 1.75 K/UL — SIGNIFICANT CHANGE UP (ref 1–3.3)
LYMPHOCYTES # BLD AUTO: 43.1 % — SIGNIFICANT CHANGE UP (ref 13–44)
MCHC RBC-ENTMCNC: 29.7 PG — SIGNIFICANT CHANGE UP (ref 27–34)
MCHC RBC-ENTMCNC: 34.7 G/DL — SIGNIFICANT CHANGE UP (ref 32–36)
MCV RBC AUTO: 85.5 FL — SIGNIFICANT CHANGE UP (ref 80–100)
MONOCYTES # BLD AUTO: 0.36 K/UL — SIGNIFICANT CHANGE UP (ref 0–0.9)
MONOCYTES NFR BLD AUTO: 8.9 % — SIGNIFICANT CHANGE UP (ref 2–14)
NEUTROPHILS # BLD AUTO: 1.85 K/UL — SIGNIFICANT CHANGE UP (ref 1.8–7.4)
NEUTROPHILS NFR BLD AUTO: 45.6 % — SIGNIFICANT CHANGE UP (ref 43–77)
NRBC # BLD: 0 /100 WBCS — SIGNIFICANT CHANGE UP (ref 0–0)
NRBC BLD-RTO: 0 /100 WBCS — SIGNIFICANT CHANGE UP (ref 0–0)
PLATELET # BLD AUTO: 215 K/UL — SIGNIFICANT CHANGE UP (ref 150–400)
RBC # BLD: 4.41 M/UL — SIGNIFICANT CHANGE UP (ref 3.8–5.2)
RBC # FLD: 13.6 % — SIGNIFICANT CHANGE UP (ref 10.3–14.5)
WBC # BLD: 4.06 K/UL — SIGNIFICANT CHANGE UP (ref 3.8–10.5)
WBC # FLD AUTO: 4.06 K/UL — SIGNIFICANT CHANGE UP (ref 3.8–10.5)

## 2025-01-13 PROCEDURE — G2211 COMPLEX E/M VISIT ADD ON: CPT | Mod: NC

## 2025-01-13 PROCEDURE — 99213 OFFICE O/P EST LOW 20 MIN: CPT

## 2025-01-14 LAB
ALBUMIN SERPL ELPH-MCNC: 4.8 G/DL
ALP BLD-CCNC: 82 U/L
ALT SERPL-CCNC: 32 U/L
ANION GAP SERPL CALC-SCNC: 13 MMOL/L
AST SERPL-CCNC: 26 U/L
BILIRUB SERPL-MCNC: 0.8 MG/DL
BUN SERPL-MCNC: 13 MG/DL
CALCIUM SERPL-MCNC: 9.3 MG/DL
CHLORIDE SERPL-SCNC: 108 MMOL/L
CO2 SERPL-SCNC: 23 MMOL/L
CREAT SERPL-MCNC: 0.63 MG/DL
EGFR: 125 ML/MIN/1.73M2
FERRITIN SERPL-MCNC: 108 NG/ML
FOLATE SERPL-MCNC: 9.3 NG/ML
GLUCOSE SERPL-MCNC: 74 MG/DL
IRON SATN MFR SERPL: 42 %
IRON SERPL-MCNC: 117 UG/DL
POTASSIUM SERPL-SCNC: 4.5 MMOL/L
PROT SERPL-MCNC: 6.8 G/DL
SODIUM SERPL-SCNC: 144 MMOL/L
TIBC SERPL-MCNC: 278 UG/DL
UIBC SERPL-MCNC: 162 UG/DL
VIT B12 SERPL-MCNC: 546 PG/ML

## (undated) DEVICE — KNIFE MEDTRONIC ENT MICROFRANCE SATALOFF SHARP

## (undated) DEVICE — DRSG CURITY GAUZE SPONGE 4 X 4" 12-PLY

## (undated) DEVICE — GLV 8 PROTEXIS (WHITE)

## (undated) DEVICE — WARMING BLANKET UPPER ADULT

## (undated) DEVICE — STAPLER SKIN PROXIMATE

## (undated) DEVICE — DRAPE TOWEL BLUE 17" X 24"

## (undated) DEVICE — LABELS BLANK W PEN

## (undated) DEVICE — TUBING SUCTION NONCONDUCTIVE 6MM X 12FT

## (undated) DEVICE — DRSG TELFA 3 X 8

## (undated) DEVICE — LIJ-SATALOFF ANT COMMISSURE LARYNGOSCOPE: Type: DURABLE MEDICAL EQUIPMENT

## (undated) DEVICE — DRAPE 3/4 SHEET 52X76"

## (undated) DEVICE — GLV 7.5 PROTEXIS (WHITE)

## (undated) DEVICE — VENODYNE/SCD SLEEVE CALF MEDIUM

## (undated) DEVICE — SOL ANTI FOG (FRED)

## (undated) DEVICE — PACK DENTAL MINOR

## (undated) DEVICE — Device

## (undated) DEVICE — TRAP SPECIMEN SPUTUM 40CC

## (undated) DEVICE — NDL HYPO REGULAR BEVEL 25G X 1.5" (BLUE)

## (undated) DEVICE — GOWN LG

## (undated) DEVICE — STAPLER SKIN VISI-STAT 35 WIDE